# Patient Record
Sex: MALE | Race: OTHER | HISPANIC OR LATINO | URBAN - METROPOLITAN AREA
[De-identification: names, ages, dates, MRNs, and addresses within clinical notes are randomized per-mention and may not be internally consistent; named-entity substitution may affect disease eponyms.]

---

## 2018-09-03 ENCOUNTER — EMERGENCY (EMERGENCY)
Facility: HOSPITAL | Age: 43
LOS: 1 days | Discharge: HOME | End: 2018-09-03
Attending: EMERGENCY MEDICINE
Payer: COMMERCIAL

## 2018-09-03 VITALS
DIASTOLIC BLOOD PRESSURE: 65 MMHG | TEMPERATURE: 100 F | OXYGEN SATURATION: 100 % | SYSTOLIC BLOOD PRESSURE: 118 MMHG | RESPIRATION RATE: 20 BRPM | HEART RATE: 99 BPM

## 2018-09-03 VITALS
SYSTOLIC BLOOD PRESSURE: 152 MMHG | HEART RATE: 94 BPM | DIASTOLIC BLOOD PRESSURE: 84 MMHG | WEIGHT: 192.02 LBS | RESPIRATION RATE: 18 BRPM | OXYGEN SATURATION: 99 % | TEMPERATURE: 98 F

## 2018-09-03 LAB
ALBUMIN SERPL ELPH-MCNC: 3.5 G/DL — SIGNIFICANT CHANGE UP (ref 3.5–5)
ALP SERPL-CCNC: 64 U/L — SIGNIFICANT CHANGE UP (ref 40–120)
ALT FLD-CCNC: 22 U/L DA — SIGNIFICANT CHANGE UP (ref 10–60)
ANION GAP SERPL CALC-SCNC: 8 MMOL/L — SIGNIFICANT CHANGE UP (ref 5–17)
AST SERPL-CCNC: 14 U/L — SIGNIFICANT CHANGE UP (ref 10–40)
BILIRUB SERPL-MCNC: 0.7 MG/DL — SIGNIFICANT CHANGE UP (ref 0.2–1.2)
BUN SERPL-MCNC: 11 MG/DL — SIGNIFICANT CHANGE UP (ref 7–18)
CALCIUM SERPL-MCNC: 9.1 MG/DL — SIGNIFICANT CHANGE UP (ref 8.4–10.5)
CHLORIDE SERPL-SCNC: 104 MMOL/L — SIGNIFICANT CHANGE UP (ref 96–108)
CO2 SERPL-SCNC: 27 MMOL/L — SIGNIFICANT CHANGE UP (ref 22–31)
CREAT SERPL-MCNC: 1.04 MG/DL — SIGNIFICANT CHANGE UP (ref 0.5–1.3)
GLUCOSE SERPL-MCNC: 99 MG/DL — SIGNIFICANT CHANGE UP (ref 70–99)
HCT VFR BLD CALC: 46.3 % — SIGNIFICANT CHANGE UP (ref 39–50)
HGB BLD-MCNC: 15.3 G/DL — SIGNIFICANT CHANGE UP (ref 13–17)
LACTATE SERPL-SCNC: 1.6 MMOL/L — SIGNIFICANT CHANGE UP (ref 0.7–2)
LYMPHOCYTES # BLD AUTO: 8 % — LOW (ref 13–44)
MCHC RBC-ENTMCNC: 30.9 PG — SIGNIFICANT CHANGE UP (ref 27–34)
MCHC RBC-ENTMCNC: 33.1 GM/DL — SIGNIFICANT CHANGE UP (ref 32–36)
MCV RBC AUTO: 93.5 FL — SIGNIFICANT CHANGE UP (ref 80–100)
MONOCYTES NFR BLD AUTO: 5 % — SIGNIFICANT CHANGE UP (ref 2–14)
NEUTROPHILS NFR BLD AUTO: 84 % — HIGH (ref 43–77)
PLATELET # BLD AUTO: 213 K/UL — SIGNIFICANT CHANGE UP (ref 150–400)
POTASSIUM SERPL-MCNC: 4.1 MMOL/L — SIGNIFICANT CHANGE UP (ref 3.5–5.3)
POTASSIUM SERPL-SCNC: 4.1 MMOL/L — SIGNIFICANT CHANGE UP (ref 3.5–5.3)
PROT SERPL-MCNC: 7.5 G/DL — SIGNIFICANT CHANGE UP (ref 6–8.3)
RBC # BLD: 4.95 M/UL — SIGNIFICANT CHANGE UP (ref 4.2–5.8)
RBC # FLD: 11.7 % — SIGNIFICANT CHANGE UP (ref 10.3–14.5)
SODIUM SERPL-SCNC: 139 MMOL/L — SIGNIFICANT CHANGE UP (ref 135–145)
WBC # BLD: 15.6 K/UL — HIGH (ref 3.8–10.5)
WBC # FLD AUTO: 15.6 K/UL — HIGH (ref 3.8–10.5)

## 2018-09-03 PROCEDURE — 83605 ASSAY OF LACTIC ACID: CPT

## 2018-09-03 PROCEDURE — 87070 CULTURE OTHR SPECIMN AEROBIC: CPT

## 2018-09-03 PROCEDURE — 10021 FNA BX W/O IMG GDN 1ST LES: CPT

## 2018-09-03 PROCEDURE — 85027 COMPLETE CBC AUTOMATED: CPT

## 2018-09-03 PROCEDURE — 80053 COMPREHEN METABOLIC PANEL: CPT

## 2018-09-03 PROCEDURE — 72193 CT PELVIS W/DYE: CPT | Mod: 26

## 2018-09-03 PROCEDURE — 96374 THER/PROPH/DIAG INJ IV PUSH: CPT

## 2018-09-03 PROCEDURE — 96361 HYDRATE IV INFUSION ADD-ON: CPT

## 2018-09-03 PROCEDURE — 99284 EMERGENCY DEPT VISIT MOD MDM: CPT | Mod: 25

## 2018-09-03 PROCEDURE — 72193 CT PELVIS W/DYE: CPT

## 2018-09-03 PROCEDURE — 87040 BLOOD CULTURE FOR BACTERIA: CPT

## 2018-09-03 PROCEDURE — 99285 EMERGENCY DEPT VISIT HI MDM: CPT

## 2018-09-03 RX ORDER — SODIUM CHLORIDE 9 MG/ML
1000 INJECTION INTRAMUSCULAR; INTRAVENOUS; SUBCUTANEOUS ONCE
Qty: 0 | Refills: 0 | Status: COMPLETED | OUTPATIENT
Start: 2018-09-03 | End: 2018-09-03

## 2018-09-03 RX ORDER — CEPHALEXIN 500 MG
1 CAPSULE ORAL
Qty: 28 | Refills: 0 | OUTPATIENT
Start: 2018-09-03 | End: 2018-09-09

## 2018-09-03 RX ORDER — VANCOMYCIN HCL 1 G
1000 VIAL (EA) INTRAVENOUS ONCE
Qty: 0 | Refills: 0 | Status: COMPLETED | OUTPATIENT
Start: 2018-09-03 | End: 2018-09-03

## 2018-09-03 RX ORDER — ACETAMINOPHEN 500 MG
650 TABLET ORAL ONCE
Qty: 0 | Refills: 0 | Status: COMPLETED | OUTPATIENT
Start: 2018-09-03 | End: 2018-09-03

## 2018-09-03 RX ADMIN — SODIUM CHLORIDE 2000 MILLILITER(S): 9 INJECTION INTRAMUSCULAR; INTRAVENOUS; SUBCUTANEOUS at 10:42

## 2018-09-03 RX ADMIN — SODIUM CHLORIDE 1000 MILLILITER(S): 9 INJECTION INTRAMUSCULAR; INTRAVENOUS; SUBCUTANEOUS at 11:51

## 2018-09-03 RX ADMIN — SODIUM CHLORIDE 2000 MILLILITER(S): 9 INJECTION INTRAMUSCULAR; INTRAVENOUS; SUBCUTANEOUS at 09:29

## 2018-09-03 RX ADMIN — Medication 250 MILLIGRAM(S): at 10:26

## 2018-09-03 RX ADMIN — Medication 650 MILLIGRAM(S): at 09:29

## 2018-09-03 RX ADMIN — SODIUM CHLORIDE 1000 MILLILITER(S): 9 INJECTION INTRAMUSCULAR; INTRAVENOUS; SUBCUTANEOUS at 10:29

## 2018-09-03 NOTE — ED PROVIDER NOTE - PHYSICAL EXAMINATION
Afebrile, hemodynamically stable, saturating well  NAD, well appearing  Head NCAT  EOMI grossly  MMM  Breathing comfortably on RA  Rectum (RN Alda GROSS as chaperone): Noted mild swelling/erythema over tailbone, does not extend anteriorly  AAO, CN's 3-12 grossly intact, nml gait  LONDONO spontaneously  Skin warm, well perfused

## 2018-09-03 NOTE — ED PROVIDER NOTE - CRITICAL CARE PROVIDED
direct patient care (not related to procedure)/interpretation of diagnostic studies/additional history taking/consultation with other physicians/consult w/ pt's family directly relating to pts condition/documentation

## 2018-09-03 NOTE — CONSULT NOTE ADULT - SUBJECTIVE AND OBJECTIVE BOX
44 y/o man with no significant PMH c/o pain in lower back over sacrum for last 2 days. Pain feels like pressure, sharp worsens with touch/pressure. Pt denies fever/chills, CP, dizziness, dysuria, injury.   WBC 15.6  Normotensive  CT pel shows fluid collection over sacral area likely abscess.    PAST MEDICAL & SURGICAL HISTORY:  Pilonidal cystectomy 15 yrs ago  Hernia repair when young    Home Medications:  None    PE: young, healthy-appearing man with some discomfort due to pain    Vital Signs Last 24 Hrs  T(C): 37.6 (03 Sep 2018 11:48), Max: 37.6 (03 Sep 2018 11:48)  T(F): 99.7 (03 Sep 2018 11:48), Max: 99.7 (03 Sep 2018 11:48)  HR: 99 (03 Sep 2018 11:48) (58 - 99)  BP: 118/65 (03 Sep 2018 11:48) (90/56 - 152/84)  BP(mean): --  RR: 20 (03 Sep 2018 11:48) (16 - 20)  SpO2: 100% (03 Sep 2018 11:48) (99% - 100%)    Chest: CTA B/L  CVS: S1S2, RRR  Abd: soft, NT, ND, +BS  Back: tenderness and induration in sacral area with calor; needle aspiration under sterile condition aspirated about 4 cc foul-smelling purulent fluid which was sent for culture. Sterile dressing placed.                          15.3   15.6  )-----------( 213      ( 03 Sep 2018 09:39 )             46.3     09-03    139  |  104  |  11  ----------------------------<  99  4.1   |  27  |  1.04    Ca    9.1      03 Sep 2018 09:39    TPro  7.5  /  Alb  3.5  /  TBili  0.7  /  DBili  x   /  AST  14  /  ALT  22  /  AlkPhos  64  09-03    < from: CT Pelvis w/ IV Cont (09.03.18 @ 10:23) >  EXAM:  CT PELVIS ONLY IC                            PROCEDURE DATE:  09/03/2018          INTERPRETATION:  CT of the pelvis with IV contrast    Indication: Tailbone abscess.    Technique: Axial multidetector CT images of the pelvis are acquired   following intravenous administration of 90 cc Omnipaque-350 (10 cc   discarded)    Comparison: None.    Findings: The urinary bladder appears unremarkable. The visualized bowel   structures are grossly intact. The appendix appears normal. No pelvic   free fluid, or enlarged pelvic lymph node.    Posteriorly adjacent to the sacrum and coccyx, there is a midline 6.0 x   2.1 x 8.2 cm soft tissue fluid collection with enhancing walls,   suggestive of an abscess. Adjacent subcutaneous stranding is noted.No CT   evidence for adjacent bony erosion of the coccyx or sacrum.    Impression:    Posteriorly adjacent to the sacrum and coccyx, there is a midline 6.0 x   2.1 x 8.2 cm soft tissue fluid collection with enhancing walls,   suggestive of an abscess. Adjacent subcutaneous stranding is noted. No CT   evidence for adjacent bony erosion of the coccyx or sacrum; if there is a   clinical suspicion for osteomyelitis involving the coccyx or sacrum, MR   of the bony pelvis may be pursued for further evaluation.      ANIL RUELAS M.D., ATTENDING RADIOLOGIST  This document has been electronically signed. Sep  3 2018 10:38AM    < end of copied text >

## 2018-09-03 NOTE — ED PROVIDER NOTE - MEDICAL DECISION MAKING DETAILS
Noted abscess, no e/o osteo at this time, no clinical evidence of cord compression. Also afebrile though noted leukocytosis. Given vancomycin and fluids. Surgery consulted, I&D'd with return, recommended outpt f/u. Discussed with pt including recommendation for admission for IV abx, however he is AAO, hemodynamically stable, well appearing, displays appropriate decision making capacity, and he states would like to go home and will take abx and f/u with surgery. Discharged with Bactrim/Keflex and f/u and return precautions.

## 2018-09-03 NOTE — ED ADULT NURSE NOTE - NSIMPLEMENTINTERV_GEN_ALL_ED
Implemented All Universal Safety Interventions:  [place] to call system. Call bell, personal items and telephone within reach. Instruct patient to call for assistance. Room bathroom lighting operational. Non-slip footwear when patient is off stretcher. Physically safe environment: no spills, clutter or unnecessary equipment. Stretcher in lowest position, wheels locked, appropriate side rails in place.

## 2018-09-03 NOTE — ED PROVIDER NOTE - OBJECTIVE STATEMENT
43yoM with h/o tailbone cyst 15 years ago that was I&D'd, presents with tailbone cyst and swelling/severe pain since Saturday. Reports subjective fever last night. No drainage, no motor or urinary deficits.

## 2018-09-03 NOTE — CONSULT NOTE ADULT - ASSESSMENT
42 y/o man with infected pilonidal cyst with abscess s/p needle aspiration    Pt was advised to be admitted for IV abx and monitoring but refuses to stay. Pt was advised to return to ED if fever/chills, increased pain etc    Recommend:  -PO abx  -Sitz bath  -F/u wound Cx  -Possible MRI of sacrum to r/o osteomyletis  -F/U with Dr Kwong  -D/W Dr Kwong and Dr Brandt

## 2018-09-04 PROBLEM — Z00.00 ENCOUNTER FOR PREVENTIVE HEALTH EXAMINATION: Status: ACTIVE | Noted: 2018-09-04

## 2018-09-05 LAB
CULTURE RESULTS: SIGNIFICANT CHANGE UP
SPECIMEN SOURCE: SIGNIFICANT CHANGE UP

## 2018-09-08 LAB
CULTURE RESULTS: SIGNIFICANT CHANGE UP
CULTURE RESULTS: SIGNIFICANT CHANGE UP
SPECIMEN SOURCE: SIGNIFICANT CHANGE UP
SPECIMEN SOURCE: SIGNIFICANT CHANGE UP

## 2018-09-19 ENCOUNTER — APPOINTMENT (OUTPATIENT)
Dept: SURGERY | Facility: CLINIC | Age: 43
End: 2018-09-19
Payer: MEDICARE

## 2018-09-19 VITALS
DIASTOLIC BLOOD PRESSURE: 82 MMHG | OXYGEN SATURATION: 99 % | SYSTOLIC BLOOD PRESSURE: 118 MMHG | TEMPERATURE: 98.4 F | HEART RATE: 66 BPM

## 2018-09-19 DIAGNOSIS — Z87.19 PERSONAL HISTORY OF OTHER DISEASES OF THE DIGESTIVE SYSTEM: ICD-10-CM

## 2018-09-19 DIAGNOSIS — Z78.9 OTHER SPECIFIED HEALTH STATUS: ICD-10-CM

## 2018-09-19 DIAGNOSIS — L05.91 PILONIDAL CYST W/OUT ABSCESS: ICD-10-CM

## 2018-09-19 PROCEDURE — 99212 OFFICE O/P EST SF 10 MIN: CPT

## 2018-09-24 PROBLEM — Z78.9 DOES NOT USE ILLICIT DRUGS: Status: ACTIVE | Noted: 2018-09-24

## 2018-09-24 PROBLEM — Z78.9 CURRENT NON-SMOKER: Status: ACTIVE | Noted: 2018-09-24

## 2018-09-24 PROBLEM — Z87.19 HISTORY OF GASTROESOPHAGEAL REFLUX (GERD): Status: RESOLVED | Noted: 2018-09-19 | Resolved: 2018-09-24

## 2018-10-16 ENCOUNTER — APPOINTMENT (OUTPATIENT)
Dept: MRI IMAGING | Facility: HOSPITAL | Age: 43
End: 2018-10-16
Payer: MEDICARE

## 2018-10-16 ENCOUNTER — OUTPATIENT (OUTPATIENT)
Dept: OUTPATIENT SERVICES | Facility: HOSPITAL | Age: 43
LOS: 1 days | End: 2018-10-16
Payer: COMMERCIAL

## 2018-10-16 DIAGNOSIS — L05.91 PILONIDAL CYST WITHOUT ABSCESS: ICD-10-CM

## 2018-10-16 PROCEDURE — 72195 MRI PELVIS W/O DYE: CPT

## 2018-10-16 PROCEDURE — 72195 MRI PELVIS W/O DYE: CPT | Mod: 26

## 2019-10-03 ENCOUNTER — TRANSCRIPTION ENCOUNTER (OUTPATIENT)
Age: 44
End: 2019-10-03

## 2021-06-21 NOTE — ED PROVIDER NOTE - TOBACCO USE
Letter by Zamzam Ram CNP at      Author: Zamzam Ram CNP Service: -- Author Type: --    Filed:  Encounter Date: 10/27/2020 Status: (Other)         Joyce LEE Theodora  [address] [place] [address]             October 27, 2020         Dear MsKelly Yip,    [employer].    Sincerely,        Electronically signed by Zamzam Ram CNP        Never smoker
